# Patient Record
Sex: FEMALE | Race: WHITE | NOT HISPANIC OR LATINO | URBAN - METROPOLITAN AREA
[De-identification: names, ages, dates, MRNs, and addresses within clinical notes are randomized per-mention and may not be internally consistent; named-entity substitution may affect disease eponyms.]

---

## 2018-06-05 ENCOUNTER — OFFICE VISIT (OUTPATIENT)
Dept: URBAN - METROPOLITAN AREA CLINIC 23 | Facility: CLINIC | Age: 48
End: 2018-06-05
Payer: COMMERCIAL

## 2018-06-05 DIAGNOSIS — H35.412 LATTICE DEGENERATION OF RETINA, LEFT EYE: Primary | ICD-10-CM

## 2018-06-05 DIAGNOSIS — H35.371 PUCKERING OF MACULA, RIGHT EYE: ICD-10-CM

## 2018-06-05 PROCEDURE — 92134 CPTRZ OPH DX IMG PST SGM RTA: CPT | Performed by: OPHTHALMOLOGY

## 2018-06-05 PROCEDURE — 92014 COMPRE OPH EXAM EST PT 1/>: CPT | Performed by: OPHTHALMOLOGY

## 2018-06-05 ASSESSMENT — INTRAOCULAR PRESSURE
OD: 5
OS: 16

## 2018-06-05 NOTE — IMPRESSION/PLAN
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 Plan: Discussed diagnosis in detail with patient. Advised patient of condition. Explained to pt that the retina is weak is certain areas of the eye. Recommend laser treatment-PCA 58365 to help support and prevent the retina from tearing or detaching (to lower the risk of symptoms). Discussed risks/benefits of treatment. All questions answered, pt understands and wishes to proceed  with the treatment, but would like to hold off for a few months as she will be traveling a lot over the Summer. Schedule PCA 46089 OS at patient's earliest convenience.

## 2018-06-05 NOTE — IMPRESSION/PLAN
Impression: S/P PPVX for repair of Retinal detachment with single break, right eye: H33.011. Condition: stable. Vision: vision affected. s/p PPVX with Gas for repair of RD OD 10/29/2016
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 Exam and OCT OD shows stable and  attached retina with slight thinning. Recommend observation for now. Will reassess the retina in 4mos.   Recommend to continue using Prednisolone 1% OD qid

## 2018-06-05 NOTE — IMPRESSION/PLAN
Impression: Puckering of macula, right eye: H35.371. OD. Condition: stable. Plan: Discussed diagnosis in detail with patient. No treatment is required at this time. Will continue to observe condition and or symptoms - see notes above.

## 2019-01-21 ENCOUNTER — OFFICE VISIT (OUTPATIENT)
Dept: URBAN - METROPOLITAN AREA CLINIC 32 | Facility: CLINIC | Age: 49
End: 2019-01-21
Payer: COMMERCIAL

## 2019-01-21 PROCEDURE — 92134 CPTRZ OPH DX IMG PST SGM RTA: CPT | Performed by: OPHTHALMOLOGY

## 2019-01-21 PROCEDURE — 92014 COMPRE OPH EXAM EST PT 1/>: CPT | Performed by: OPHTHALMOLOGY

## 2019-01-21 RX ORDER — DUREZOL 0.5 MG/ML
0.05 % EMULSION OPHTHALMIC
Qty: 5 | Refills: 5 | Status: INACTIVE
Start: 2019-01-21 | End: 2019-04-04

## 2019-01-21 ASSESSMENT — INTRAOCULAR PRESSURE
OD: 5
OS: 14

## 2019-01-21 NOTE — IMPRESSION/PLAN
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 Condition: stable. Plan: Discussed diagnosis in detail with patient. Advised patient of condition. Explained again to pt that the retina is weak is certain areas of the eye. Recommend laser treatment-PCA 06366 to help support and prevent the retina from tearing or detaching (to lower the risk of symptoms). Discussed risks/benefits of treatment. All questions answered, pt understands and wishes to proceed  with the treatment Schedule PCA 22009 OS at patient's earliest convenience.

## 2019-01-21 NOTE — IMPRESSION/PLAN
Impression: Puckering of macula, right eye: H35.371. OD. Condition: stable. Plan: Discussed diagnosis in detail with patient. No treatment is required at this time. Will continue to observe condition and or symptoms - see notes below.

## 2019-01-21 NOTE — IMPRESSION/PLAN
Impression: S/P PPVX for repair of Retinal detachment with single break, right eye: H33.011. Condition: stable. Vision: vision affected. s/p PPVX with Gas for repair of RD OD 10/29/2016
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 Exam and OCT OD shows stable ERM and  attached retina with slight thinning. Recommend observation for now. Will reassess the retina in 4mos. IOP is 5 today, has not increased. Recommend to discontinue Prednisolone 1%, and start Durezol 4-5x daily OD. Durezol Erx to pharmacy on file.

## 2019-02-05 ENCOUNTER — SURGERY (OUTPATIENT)
Dept: URBAN - METROPOLITAN AREA SURGERY 11 | Facility: SURGERY | Age: 49
End: 2019-02-05
Payer: COMMERCIAL

## 2019-02-05 PROCEDURE — 67145 PROPH RTA DTCHMNT PC: CPT | Performed by: OPHTHALMOLOGY

## 2019-02-13 ENCOUNTER — POST-OPERATIVE VISIT (OUTPATIENT)
Dept: URBAN - METROPOLITAN AREA CLINIC 23 | Facility: CLINIC | Age: 49
End: 2019-02-13

## 2019-02-13 ASSESSMENT — INTRAOCULAR PRESSURE
OS: 15
OD: 6

## 2019-03-22 ENCOUNTER — POST-OPERATIVE VISIT (OUTPATIENT)
Dept: URBAN - METROPOLITAN AREA CLINIC 23 | Facility: CLINIC | Age: 49
End: 2019-03-22

## 2019-03-22 DIAGNOSIS — Z09 ENCNTR FOR F/U EXAM AFT TRTMT FOR COND OTH THAN MALIG NEOPLM: Primary | ICD-10-CM

## 2019-03-22 PROCEDURE — 99024 POSTOP FOLLOW-UP VISIT: CPT | Performed by: OPHTHALMOLOGY

## 2019-03-22 ASSESSMENT — INTRAOCULAR PRESSURE
OD: 5
OS: 15

## 2019-07-18 ENCOUNTER — OFFICE VISIT (OUTPATIENT)
Dept: URBAN - METROPOLITAN AREA CLINIC 23 | Facility: CLINIC | Age: 49
End: 2019-07-18
Payer: COMMERCIAL

## 2019-07-18 DIAGNOSIS — H43.812 VITREOUS DEGENERATION, LEFT EYE: ICD-10-CM

## 2019-07-18 PROCEDURE — 92134 CPTRZ OPH DX IMG PST SGM RTA: CPT | Performed by: OPHTHALMOLOGY

## 2019-07-18 PROCEDURE — 99213 OFFICE O/P EST LOW 20 MIN: CPT | Performed by: OPHTHALMOLOGY

## 2019-07-18 RX ORDER — DUREZOL 0.5 MG/ML
0.05 % EMULSION OPHTHALMIC
Qty: 5 | Refills: 5 | Status: INACTIVE
Start: 2019-07-18 | End: 2020-04-07

## 2019-07-18 ASSESSMENT — INTRAOCULAR PRESSURE
OS: 13
OD: 5

## 2019-07-18 NOTE — IMPRESSION/PLAN
Impression: S/P PPVX for repair of Retinal detachment with single break, right eye: H33.011. Condition: stable. Vision: vision affected. s/p PPVX with Gas for repair of RD OD 10/29/2016
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 Exam and OCT OD shows stable ERM and  attached retina with slight thinning. Recommend observation for now. Will reassess the retina in 6mos. IOP is 5 today, has not increased. Recommend to discontinue Prednisolone 1%, and start Durezol 4-5x daily OD. Durezol Erx to pharmacy on file.

## 2019-07-18 NOTE — IMPRESSION/PLAN
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 Condition: stable. Plan: Discussed diagnosis in detail with patient. Explained exam shows no evidence of retinal pathology. Discussed signs and symptoms of PVD/floaters. Discussed signs and symptoms of retinal detachment. No treatment is required at this time. Will continue to observe condition and or symptoms. Come in ASAP if there is a change or decrease in vision.

## 2020-01-24 ENCOUNTER — OFFICE VISIT (OUTPATIENT)
Dept: URBAN - METROPOLITAN AREA CLINIC 23 | Facility: CLINIC | Age: 50
End: 2020-01-24
Payer: COMMERCIAL

## 2020-01-24 PROCEDURE — 92134 CPTRZ OPH DX IMG PST SGM RTA: CPT | Performed by: OPHTHALMOLOGY

## 2020-01-24 PROCEDURE — 99213 OFFICE O/P EST LOW 20 MIN: CPT | Performed by: OPHTHALMOLOGY

## 2020-01-24 ASSESSMENT — INTRAOCULAR PRESSURE
OD: 5
OS: 15

## 2020-01-24 NOTE — IMPRESSION/PLAN
Impression: S/P PPVX for repair of Retinal detachment with single break, right eye: H33.011. Condition: stable. Vision: vision affected. s/p PPVX with Gas for repair of RD OD 10/29/2016
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 Exam and OCT OD shows stable mild ERM and  attached retina. Discussed additional surgery to remove the S. O. with concern of low IOP vs observation. Recommend a retina follow - up in 6 mos and patient can contemplate surgery OD in the future. Recommend to continue using Durezol  4 x's daily OD. OCT OD shows thickening, distortion centrally OD.

## 2020-01-24 NOTE — IMPRESSION/PLAN
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 Condition: stable. s/p PCA OS 2/5/2019 Plan: Discussed diagnosis in detail with patient. Advised patient of condition. Exam and OCT shows retina is fully attached and stable with PVD. No treatment is required. OCT OS shows PVD.

## 2020-07-22 ENCOUNTER — OFFICE VISIT (OUTPATIENT)
Dept: URBAN - METROPOLITAN AREA CLINIC 23 | Facility: CLINIC | Age: 50
End: 2020-07-22
Payer: COMMERCIAL

## 2020-07-22 PROCEDURE — 99213 OFFICE O/P EST LOW 20 MIN: CPT | Performed by: OPHTHALMOLOGY

## 2020-07-22 ASSESSMENT — INTRAOCULAR PRESSURE
OD: 7
OS: 16

## 2020-07-22 NOTE — IMPRESSION/PLAN
Impression: S/P PPVX for repair of Retinal detachment with single break, right eye: H33.011. Condition: stable. Vision: vision affected. s/p PPVX with Gas for repair of RD OD 10/29/2016
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 Findings are based on OCT and Optos. OCT shows stable neg ERM neg edema OD and Optos shows 360 laser retina attached OD. No treatment is require at this time. Discussed additional surgery to remove the S. O. with concern of low IOP vs observation. Recommend a retina follow - up in 4 mos and patient can contemplate surgery OD in the future. Recommend to continue using Durezol  4 x's daily OD.

## 2020-11-23 ENCOUNTER — OFFICE VISIT (OUTPATIENT)
Dept: URBAN - METROPOLITAN AREA CLINIC 23 | Facility: CLINIC | Age: 50
End: 2020-11-23
Payer: COMMERCIAL

## 2020-11-23 DIAGNOSIS — H33.011 RETINAL DETACHMENT WITH SINGLE BREAK, RIGHT EYE: Primary | ICD-10-CM

## 2020-11-23 PROCEDURE — 92134 CPTRZ OPH DX IMG PST SGM RTA: CPT | Performed by: OPHTHALMOLOGY

## 2020-11-23 PROCEDURE — 99213 OFFICE O/P EST LOW 20 MIN: CPT | Performed by: OPHTHALMOLOGY

## 2020-11-23 ASSESSMENT — INTRAOCULAR PRESSURE
OS: 14
OD: 4

## 2020-11-23 NOTE — IMPRESSION/PLAN
Impression: S/P PPVX for repair of Retinal detachment with single break, right eye: H33.011. Condition: stable. Vision: vision affected. s/p PPVX with Gas for repair of RD OD 10/29/2016
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 Exam OD shows the retina is attached and stable with S. O. Discussed low IOP, recommend observation, continue using Pred Acetate OD QID. OCT and Optos OD shows kevin retina is stable. Will reassess condition in 6 mos.

## 2021-05-20 ENCOUNTER — OFFICE VISIT (OUTPATIENT)
Dept: URBAN - METROPOLITAN AREA CLINIC 23 | Facility: CLINIC | Age: 51
End: 2021-05-20
Payer: COMMERCIAL

## 2021-05-20 PROCEDURE — 99213 OFFICE O/P EST LOW 20 MIN: CPT | Performed by: OPHTHALMOLOGY

## 2021-05-20 PROCEDURE — 92134 CPTRZ OPH DX IMG PST SGM RTA: CPT | Performed by: OPHTHALMOLOGY

## 2021-05-20 ASSESSMENT — INTRAOCULAR PRESSURE
OD: 8
OS: 17

## 2021-05-20 NOTE — IMPRESSION/PLAN
Impression: S/P PPVX for repair of Retinal detachment with single break, right eye: H33.011. Condition: stable. Vision: vision affected. s/p PPVX with Gas for repair of RD OD 10/29/2016
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 Exam OD shows the retina is attached and stable with S. O. Discussed low IOP, recommend observation, continue using Pred Acetate OD QID. OCT and Optos OD shows the retina is stable. Will reassess condition in 6 mos.

## 2021-12-20 ENCOUNTER — OFFICE VISIT (OUTPATIENT)
Dept: URBAN - METROPOLITAN AREA CLINIC 23 | Facility: CLINIC | Age: 51
End: 2021-12-20
Payer: COMMERCIAL

## 2021-12-20 PROCEDURE — 99213 OFFICE O/P EST LOW 20 MIN: CPT | Performed by: OPHTHALMOLOGY

## 2021-12-20 PROCEDURE — 92134 CPTRZ OPH DX IMG PST SGM RTA: CPT | Performed by: OPHTHALMOLOGY

## 2021-12-20 ASSESSMENT — INTRAOCULAR PRESSURE
OD: 8
OS: 17

## 2021-12-20 NOTE — IMPRESSION/PLAN
Impression: S/P PPVX for repair of Retinal detachment with single break, right eye: H33.011. Condition: stable. Vision: vision affected. s/p PPVX with Gas for repair of RD OD 10/29/2016
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 Exam OD shows the retina is attached and stable with S. O. in place. Discussed the possibility of additional surgery to remove the S. O. , the IOP has been stable. Recommend observation for now. Will revisit discussion of surgery in 6 mos. Continue using Pred Acetate OD QID. OCT and Optos OD shows the retina is stable.

## 2022-07-20 ENCOUNTER — OFFICE VISIT (OUTPATIENT)
Dept: URBAN - METROPOLITAN AREA CLINIC 23 | Facility: CLINIC | Age: 52
End: 2022-07-20
Payer: COMMERCIAL

## 2022-07-20 DIAGNOSIS — H33.011 RETINAL DETACHMENT WITH SINGLE BREAK, RIGHT EYE: Primary | ICD-10-CM

## 2022-07-20 PROCEDURE — 99213 OFFICE O/P EST LOW 20 MIN: CPT | Performed by: OPHTHALMOLOGY

## 2022-07-20 PROCEDURE — 92134 CPTRZ OPH DX IMG PST SGM RTA: CPT | Performed by: OPHTHALMOLOGY

## 2022-07-20 ASSESSMENT — INTRAOCULAR PRESSURE
OD: 9
OS: 16

## 2022-07-20 NOTE — IMPRESSION/PLAN
Impression: S/P PPVX for repair of Retinal detachment with single break, right eye: H33.011. Condition: stable. Vision: vision affected. s/p PPVX with Gas for repair of RD OD 10/29/2016
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 Exam OD shows the retina is attached and stable with S. O. in place, IOP OD is stable, recommend to continue using Prednisolone OD QID. Recommend observation for now. Patient can proceed with a diagnostic refraction to evaluate the vision potential and perhaps consider S. O. removal. Will revisit discussion of surgery in 6 - 8 mos.

## 2023-02-23 ENCOUNTER — OFFICE VISIT (OUTPATIENT)
Dept: URBAN - METROPOLITAN AREA CLINIC 23 | Facility: CLINIC | Age: 53
End: 2023-02-23
Payer: COMMERCIAL

## 2023-02-23 DIAGNOSIS — H33.011 RETINAL DETACHMENT WITH SINGLE BREAK, RIGHT EYE: Primary | ICD-10-CM

## 2023-02-23 PROCEDURE — 99213 OFFICE O/P EST LOW 20 MIN: CPT | Performed by: OPHTHALMOLOGY

## 2023-02-23 PROCEDURE — 92134 CPTRZ OPH DX IMG PST SGM RTA: CPT | Performed by: OPHTHALMOLOGY

## 2023-02-23 ASSESSMENT — INTRAOCULAR PRESSURE
OS: 17
OD: 9

## 2023-02-23 NOTE — IMPRESSION/PLAN
Impression: S/P PPVX for repair of Retinal detachment with single break, right eye: H33.011. Condition: stable. Vision: vision affected. s/p PPVX with Gas for repair of RD OD 10/29/2016
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 Exam OD shows the retina is attached and stable with S. O. in place, IOP OD is stable. Continue using Prednisolone QID OD. OCT OD shows stable and Optos OD shows limited view. Recommend observation for now. Discussed retina has been stable, will consider S.O. removal. Discussed chances of decreased IOP after surgery. Will reassess the retina in 3 months.